# Patient Record
Sex: FEMALE | Race: WHITE | NOT HISPANIC OR LATINO | URBAN - METROPOLITAN AREA
[De-identification: names, ages, dates, MRNs, and addresses within clinical notes are randomized per-mention and may not be internally consistent; named-entity substitution may affect disease eponyms.]

---

## 2019-04-21 ENCOUNTER — WALK IN (OUTPATIENT)
Dept: URGENT CARE | Age: 29
End: 2019-04-21

## 2019-04-21 DIAGNOSIS — J02.0 STREP PHARYNGITIS: Primary | ICD-10-CM

## 2019-04-21 LAB
INTERNAL PROCEDURAL CONTROLS ACCEPTABLE: YES
S PYO AG THROAT QL IA.RAPID: NEGATIVE

## 2019-04-21 PROCEDURE — 99203 OFFICE O/P NEW LOW 30 MIN: CPT | Performed by: NURSE PRACTITIONER

## 2019-04-21 PROCEDURE — 87880 STREP A ASSAY W/OPTIC: CPT | Performed by: NURSE PRACTITIONER

## 2019-04-21 RX ORDER — AMOXICILLIN 500 MG/1
500 TABLET, FILM COATED ORAL 2 TIMES DAILY
Qty: 14 TABLET | Refills: 0 | Status: SHIPPED | OUTPATIENT
Start: 2019-04-21 | End: 2019-04-28

## 2019-04-21 ASSESSMENT — ENCOUNTER SYMPTOMS
EYE ITCHING: 0
COUGH: 0
FATIGUE: 1
SINUS PRESSURE: 0
FEVER: 1
EYE DISCHARGE: 0
NAUSEA: 0
SORE THROAT: 1
ABDOMINAL PAIN: 0
SWOLLEN GLANDS: 1
TROUBLE SWALLOWING: 1
CHILLS: 1
HEADACHES: 0
SHORTNESS OF BREATH: 0
EYE PAIN: 0
WHEEZING: 0
SINUS PAIN: 0
EYE REDNESS: 0
CHOKING: 0

## 2019-04-21 ASSESSMENT — PAIN SCALES - GENERAL: PAINLEVEL: 3-4

## 2019-11-30 ENCOUNTER — WALK IN (OUTPATIENT)
Dept: URGENT CARE | Age: 29
End: 2019-11-30

## 2019-11-30 DIAGNOSIS — H66.003 NON-RECURRENT ACUTE SUPPURATIVE OTITIS MEDIA OF BOTH EARS WITHOUT SPONTANEOUS RUPTURE OF TYMPANIC MEMBRANES: Primary | ICD-10-CM

## 2019-11-30 PROCEDURE — 99214 OFFICE O/P EST MOD 30 MIN: CPT | Performed by: NURSE PRACTITIONER

## 2019-11-30 RX ORDER — AMOXICILLIN 875 MG/1
875 TABLET, COATED ORAL 2 TIMES DAILY
Qty: 20 TABLET | Refills: 0 | Status: SHIPPED | OUTPATIENT
Start: 2019-11-30 | End: 2019-12-10

## 2019-11-30 ASSESSMENT — ENCOUNTER SYMPTOMS
RHINORRHEA: 1
FATIGUE: 0
ABDOMINAL PAIN: 0
SHORTNESS OF BREATH: 0
WHEEZING: 0
SINUS PRESSURE: 1
CHILLS: 0
SORE THROAT: 0
NAUSEA: 0
VOMITING: 0
COUGH: 1
FEVER: 1
HEADACHES: 1
SINUS PAIN: 1

## 2019-11-30 ASSESSMENT — PAIN SCALES - GENERAL: PAINLEVEL: 5-6

## 2021-05-26 VITALS
HEART RATE: 77 BPM | HEIGHT: 66 IN | WEIGHT: 150 LBS | OXYGEN SATURATION: 100 % | TEMPERATURE: 98.5 F | SYSTOLIC BLOOD PRESSURE: 110 MMHG | DIASTOLIC BLOOD PRESSURE: 74 MMHG | BODY MASS INDEX: 24.11 KG/M2 | DIASTOLIC BLOOD PRESSURE: 68 MMHG | OXYGEN SATURATION: 99 % | BODY MASS INDEX: 24.11 KG/M2 | TEMPERATURE: 100 F | HEIGHT: 66 IN | RESPIRATION RATE: 14 BRPM | HEART RATE: 64 BPM | RESPIRATION RATE: 16 BRPM | SYSTOLIC BLOOD PRESSURE: 110 MMHG | WEIGHT: 150 LBS

## 2023-03-06 ENCOUNTER — OFFICE VISIT (OUTPATIENT)
Dept: INTERNAL MEDICINE CLINIC | Facility: CLINIC | Age: 33
End: 2023-03-06

## 2023-03-06 VITALS
OXYGEN SATURATION: 98 % | BODY MASS INDEX: 22.82 KG/M2 | DIASTOLIC BLOOD PRESSURE: 70 MMHG | SYSTOLIC BLOOD PRESSURE: 108 MMHG | HEIGHT: 66 IN | WEIGHT: 142 LBS | RESPIRATION RATE: 14 BRPM | HEART RATE: 74 BPM

## 2023-03-06 DIAGNOSIS — J30.9 ALLERGIC RHINITIS, UNSPECIFIED SEASONALITY, UNSPECIFIED TRIGGER: ICD-10-CM

## 2023-03-06 DIAGNOSIS — J45.20 MILD INTERMITTENT ASTHMA, UNSPECIFIED WHETHER COMPLICATED: Primary | ICD-10-CM

## 2023-03-06 RX ORDER — FLUTICASONE PROPIONATE 110 UG/1
1 AEROSOL, METERED RESPIRATORY (INHALATION) 2 TIMES DAILY
Qty: 1 EACH | Refills: 2 | Status: SHIPPED | OUTPATIENT
Start: 2023-03-06 | End: 2023-04-05

## 2023-03-06 RX ORDER — NORETHINDRONE ACETATE AND ETHINYL ESTRADIOL AND FERROUS FUMARATE 1MG-20(21)
1 KIT ORAL DAILY
COMMUNITY
Start: 2023-02-10

## 2023-03-06 RX ORDER — ALBUTEROL SULFATE 90 UG/1
2 AEROSOL, METERED RESPIRATORY (INHALATION) EVERY 6 HOURS PRN
Qty: 1 EACH | Refills: 3 | Status: SHIPPED | OUTPATIENT
Start: 2023-03-06

## 2025-06-06 NOTE — TELEPHONE ENCOUNTER
LM for patient on cell number. No answer on listed number for home.  Offered 6/13 1030 appt for Dr. Carballo in [place]. Requested patient call back to confirm. Records received from [facility].

## 2025-06-06 NOTE — TELEPHONE ENCOUNTER
Pt is calling to schedule a new consult appt.  Patient Name- Bindu Cuba  Referred by- Dr. Jessica Westbrook  Referred to- first available   Reason- low iron  Insurance-[employer] PPO  Please give pt a call back. Thank you.    Advised the pt of seanmercedes for her doctor to send records and a referral. Pt stated her doctor told her she does not need a referral. I advised it's the office policy and she will have to have a referral before she's schedule or order

## 2025-06-13 ENCOUNTER — OFFICE VISIT (OUTPATIENT)
Age: 35
End: 2025-06-13
Attending: INTERNAL MEDICINE
Payer: COMMERCIAL

## 2025-06-13 VITALS
DIASTOLIC BLOOD PRESSURE: 74 MMHG | RESPIRATION RATE: 18 BRPM | OXYGEN SATURATION: 100 % | TEMPERATURE: 98 F | SYSTOLIC BLOOD PRESSURE: 112 MMHG | HEIGHT: 66 IN | BODY MASS INDEX: 25.39 KG/M2 | WEIGHT: 158 LBS | HEART RATE: 84 BPM

## 2025-06-13 DIAGNOSIS — D50.0 IRON DEFICIENCY ANEMIA DUE TO CHRONIC BLOOD LOSS: Primary | ICD-10-CM

## 2025-06-13 LAB
BASOPHILS # BLD AUTO: 0.03 X10(3) UL (ref 0–0.2)
BASOPHILS NFR BLD AUTO: 0.3 %
EOSINOPHIL # BLD AUTO: 0.05 X10(3) UL (ref 0–0.7)
EOSINOPHIL NFR BLD AUTO: 0.5 %
ERYTHROCYTE [DISTWIDTH] IN BLOOD BY AUTOMATED COUNT: 13.1 %
FOLATE SERPL-MCNC: 29.4 NG/ML (ref 5.4–?)
HCT VFR BLD AUTO: 30.5 % (ref 35–48)
HGB BLD-MCNC: 10.5 G/DL (ref 12–16)
HGB RETIC QN AUTO: 32.8 PG (ref 28.2–36.6)
IMM GRANULOCYTES # BLD AUTO: 0.11 X10(3) UL (ref 0–1)
IMM GRANULOCYTES NFR BLD: 1.1 %
IMM RETICS NFR: 0.14 RATIO (ref 0.1–0.3)
IRON SATN MFR SERPL: 13 % (ref 15–50)
IRON SERPL-MCNC: 62 UG/DL (ref 50–170)
LYMPHOCYTES # BLD AUTO: 1.36 X10(3) UL (ref 1–4)
LYMPHOCYTES NFR BLD AUTO: 13.5 %
MCH RBC QN AUTO: 30.1 PG (ref 26–34)
MCHC RBC AUTO-ENTMCNC: 34.4 G/DL (ref 31–37)
MCV RBC AUTO: 87.4 FL (ref 80–100)
MONOCYTES # BLD AUTO: 0.58 X10(3) UL (ref 0.1–1)
MONOCYTES NFR BLD AUTO: 5.8 %
NEUTROPHILS # BLD AUTO: 7.91 X10 (3) UL (ref 1.5–7.7)
NEUTROPHILS # BLD AUTO: 7.91 X10(3) UL (ref 1.5–7.7)
NEUTROPHILS NFR BLD AUTO: 78.8 %
PLATELET # BLD AUTO: 198 10(3)UL (ref 150–450)
RBC # BLD AUTO: 3.49 X10(6)UL (ref 3.8–5.3)
RETICS # AUTO: 62.5 X10(3) UL (ref 22.5–147.5)
RETICS/RBC NFR AUTO: 1.8 % (ref 0.5–2.5)
TOTAL IRON BINDING CAPACITY: 486 UG/DL (ref 250–425)
TRANSFERRIN SERPL-MCNC: 395 MG/DL (ref 250–380)
VIT B12 SERPL-MCNC: 241 PG/ML (ref 211–911)
WBC # BLD AUTO: 10 X10(3) UL (ref 4–11)

## 2025-06-13 RX ORDER — ASPIRIN 81 MG/1
81 TABLET ORAL DAILY
COMMUNITY

## 2025-06-13 RX ORDER — PRENATAL VIT 49/IRON FUM/FOLIC 6.75-0.2MG
TABLET ORAL AS DIRECTED
COMMUNITY

## 2025-06-13 NOTE — CONSULTS
[facility]ology [facility] Initial Consultation Note    Patient Name: Bindu Sanderson  Medical Record Number: [id number]    YOB: 1990   Date of Consultation: 6/13/2025     Reason for Consultation/Chief Complaint:  iron deficiency anemia      History of Present Illness:  Mrs. Sanderson is a 35 year old F (currently 33 weeks pregnant) with PMHx of iron deficiency anemia who presents to hematology/oncology today for evaluation and treatment recommendations of iron deficiency anemia.    5/7/25: She got outpatient labs done which showed the following  --  Hb 10.9 g/dL  MCV 88   Plt 236  Ferritin 9.1 ng/mL    She is fatigued. Has never had IV iron before. Is on PO iron supplements but these are constipating her.    Past Medical History:  Past Medical History:    Asthma (HCC)       Past Surgical History:  History reviewed. No pertinent surgical history.    Current Outpatient Medications:  Current Outpatient Medications on File Prior to Visit   Medication Sig Dispense Refill    aspirin 81 MG Oral Tab EC Take 1 tablet (81 mg total) by mouth daily.      Prenatal 6.75-0.2 MG Oral Tab Take by mouth As Directed.      albuterol 108 (90 Base) MCG/ACT Inhalation Aero Soln Inhale 2 puffs into the lungs every 6 (six) hours as needed for Wheezing. 1 each 3     No current facility-administered medications on file prior to visit.       Allergies:   No Known Allergies    Family Medical History:  History reviewed. No pertinent family history.    Social History:  Social History     Social History Narrative    Not on file     Social History     Socioeconomic History    Marital status: Single   Tobacco Use    Smoking status: Never    Smokeless tobacco: Never   Vaping Use    Vaping status: Never Used   Substance and Sexual Activity    Alcohol use: Not Currently     Comment: Socially    Drug use: Never    Sexual activity: Yes     Social Drivers of Health     Food Insecurity: Low Risk  (3/21/2024)    Received from  [facility]    Food Insecurity     Have there been times that your food ran out, and you didn't have money to get more?: No     Are there times that you worry that this might happen?: No   Transportation Needs: Low Risk  (3/21/2024)    Received from [facility]    Transportation Needs     Do you have trouble getting transportation to medical appointments?: No     How do you normally get to and from your appointments?: Family/Friend       Review of Systems:  A 10-point ROS was done with pertinent positives and negatives per the HPI.     ECOG Performance Status: 0    Vital Signs:  Height: 167.6 cm (5' 6\") (06/13 1041)  Weight: 71.7 kg (158 lb) (06/13 1041)  BSA (Calculated - sq m): 1.81 sq meters (06/13 1041)  Pulse: 84 (06/13 1041)  BP: 112/74 (06/13 1041)  Temp: 97.5 °F (36.4 °C) (06/13 1041)  Do Not Use - Resp Rate: --  SpO2: 100 % (06/13 1041)  Wt Readings from Last 6 Encounters:   06/13/25 71.7 kg (158 lb)   03/06/23 64.4 kg (142 lb)       Physical Examination:  General: Well-nourished and well-appearing. No acute distress. Pleasant affect  Eyes: EOMI  Respiratory: Lungs clear to auscultation bilaterally.  Cardiovascular: Regular rate and rhythm, no murmurs. No lower extremity edema.   GI: Gravid uterus  Heme: normal capillary refill. No ecchymosis, petechiae, or purpura.  Neurological: Alert and oriented. No apparent focal sensory or motor deficits  Skin: no rashes or lesions.  Psychiatric: Normal mood and affect.    Data Review  Laboratory Studies:  No results for input(s): \"WBC\", \"HGB\", \"HCT\", \"PLT\", \"MCV\", \"RDW\", \"NEPRELIM\" in the last 168 hours.  No results for input(s): \"NA\", \"K\", \"CL\", \"CO2\", \"BUN\", \"CREATSERUM\", \"GFRAA\", \"GFRNAA\", \"GLU\", \"CA\", \"PHOS\", \"TP\", \"ALB\", \"ALKPHO\", \"AST\", \"ALT\", \"BILT\" in the last 168 hours.    Invalid input(s): \"MAG\"  No results for input(s): \"PT\", \"INR\", \"PTT\", \"FIB\" in the last 168 hours.      Impression/Recommendations:  .  Kin is a 35 year old F (currently 33 weeks pregnant) with PMHx of iron deficiency anemia who presents to hematology/oncology today for evaluation and treatment recommendations of iron deficiency anemia.    Severe Iron Deficiency Anemia; Anemia of Third Trimester Pregnancy     5/7/25: She got outpatient labs done which showed the following  --  Hb 10.9 g/dL  MCV 88   Plt 236  Ferritin 9.1 ng/mL    She is fatigued. Has never had IV iron before. Is on PO iron supplements but these are constipating her.      Recommendations:  Labs today: CBC with diff, iron/TIBC, iron saturation, Vit B12, folate    Patient is symptomatic from severe iron deficiency anemia (9 ng/mL), as per H&P and assessment above. Patient will benefit from IV iron, with a targeted goal of ferritin 50 ng/mL. Today, I discussed with the patient her diagnosis of iron deficiency anemia, and the proposed treatment of IV iron. Benefits and risks were discussed in detail. Written information was also provided. After thorough discussion, patient has consented to start IV Iron Dextran (1 dose, 1000 mg, IV over 1 hour -- with 25 mg test dose to be given prior to infusion).    Orders were placed for IV Iron Dextran. Patient will schedule appointment for IV iron infusion once insurance approval is obtained.      Follow Up: Return to clinic in 4 weeks after IV iron for labs and visit with me.     Nicole Carballo D.O.  [facility]      Note to patient: The 21 Century Cures Act makes medical notes like these available to patients in the interest of transparency. However, be advised this is a medical document. It is intended as peer to peer communication. It is written in medical language and may contain abbreviations or verbiage that are unfamiliar. It may appear blunt or direct. Medical documents are intended to carry relevant information, facts as evident, and the clinical opinion of the practitioner.       In reviewing this  note, please be advised that Dragon Voice Recognition software used to dictate the note may have made errors in recognizing some of the words or phrases.

## 2025-06-13 NOTE — PATIENT INSTRUCTIONS
Hello,     You were seen today for your diagnosis of iron deficiency anemia. Your ferritin (storage of iron) is low and you need IV iron.     I have ordered 1 dose of IV Iron Dextran (INFeD) for you. Please schedule this once insurance approves the IV iron (our team will call you to make the appointment).    We attached some information about iron-rich foods (kale, spinach, dark/leafy vegetables). Please try to incorporate these into your diet.     Please schedule a follow up appointment to see me again 6-8 weeks after your infusion. We will repeat iron studies (labs) at this time to see if you absorbed the iron appropriately.     Thank you!     It was a pleasure meeting you.  Dr. Carballo

## 2025-06-13 NOTE — PROGRESS NOTES
Pt here for new consult regarding iron deficiency anemia. Medications and medical history reviewed. Pt is currently 33 weeks pregnant. She is currently on iron tablets which cause her mild constipation. She is fatigued. She states her meat intake has been decreased with pregnancy since she is unable to tolerate it. No rectal bleeding or blood in the stool reported. No dyspnea reported. No previous hx of iron or blood transfusions.       Education Record    Learner:  Patient    Disease / Diagnosis: PAULO in pregnancy    Barriers / Limitations:  None   Comments:    Method:  Discussion   Comments:    General Topics:  Medication, Pain, Side effects and symptom management, and Plan of care reviewed   Comments:    Outcome:  Observed demonstration and Shows understanding   Comments:

## 2025-06-16 RX ORDER — MELATONIN
1000 DAILY
Qty: 30 TABLET | Refills: 2 | Status: SHIPPED | OUTPATIENT
Start: 2025-06-16 | End: 2025-06-16

## 2025-06-16 RX ORDER — MELATONIN
1000 DAILY
Qty: 30 TABLET | Refills: 2 | Status: SHIPPED | OUTPATIENT
Start: 2025-06-16

## 2025-06-26 ENCOUNTER — OFFICE VISIT (OUTPATIENT)
Age: 35
End: 2025-06-26
Attending: INTERNAL MEDICINE
Payer: COMMERCIAL

## 2025-06-26 ENCOUNTER — TELEPHONE (OUTPATIENT)
Age: 35
End: 2025-06-26

## 2025-06-26 VITALS
SYSTOLIC BLOOD PRESSURE: 103 MMHG | HEART RATE: 80 BPM | OXYGEN SATURATION: 99 % | TEMPERATURE: 98 F | DIASTOLIC BLOOD PRESSURE: 63 MMHG | RESPIRATION RATE: 16 BRPM

## 2025-06-26 DIAGNOSIS — D50.0 IRON DEFICIENCY ANEMIA DUE TO CHRONIC BLOOD LOSS: Primary | ICD-10-CM

## 2025-06-26 NOTE — TELEPHONE ENCOUNTER
Spoke with patient and aware of insurance preferred medication and letter received for Infed denial.  Pt understanding and will come to appt today as scheduled.

## 2025-06-26 NOTE — PROGRESS NOTES
Pt here for Feraheme. Pt denies any issues or concerns.      Ordering Provider: aKit Cuenca Exp: 1 of 2 remain     Pt tolerated infusion without difficulty or complaint. Reviewed next appt date/time: yes, 7/2 3 pm for #2 Feraheme and 7/24 for labs/MD as previously scheduled.      Education Record  Learner:  Patient  Disease / Diagnosis: Iron Deficiency anemia in pregnancy  Barriers / Limitations:  None  Method:  Brief focused and Discussion  General Topics:  Medication, Side effects and symptom management, and Plan of care reviewed  Outcome:  Shows understanding    Post vitals checked. Discharged home in stable condition, no new complaints.

## 2025-07-02 ENCOUNTER — OFFICE VISIT (OUTPATIENT)
Age: 35
End: 2025-07-02
Attending: INTERNAL MEDICINE
Payer: COMMERCIAL

## 2025-07-02 VITALS
RESPIRATION RATE: 16 BRPM | OXYGEN SATURATION: 99 % | TEMPERATURE: 98 F | SYSTOLIC BLOOD PRESSURE: 100 MMHG | HEART RATE: 73 BPM | DIASTOLIC BLOOD PRESSURE: 62 MMHG

## 2025-07-02 DIAGNOSIS — D50.0 IRON DEFICIENCY ANEMIA DUE TO CHRONIC BLOOD LOSS: Primary | ICD-10-CM

## 2025-07-02 NOTE — PROGRESS NOTES
Education Record    Learner:  Patient    Disease / Diagnosis: iron deficiency anemia    Barriers / Limitations:  None   Comments:    Method:  Brief focused   Comments:    General Topics:  Plan of care reviewed   Comments:    Outcome:  Shows understanding   Comments:    Pt discharged in stable condition.  Pt to rtc 7/24 for md je appt.

## 2025-07-26 NOTE — PROGRESS NOTES
Pt to be discharged home, reactive NST and she is now feeling baby move. Dr Mesa reviewed fetal tracing and spoke with patient and her . Return precautions reviewed, pt verbalizes understanding.

## 2025-07-26 NOTE — ANESTHESIA PROCEDURE NOTES
Labor Analgesia    Date/Time: 7/26/2025 2:31 PM    Performed by: Bob Aldana MD  Authorized by: Bob Aldana MD      General Information and Staff    Start Time:  7/26/2025 2:31 PM  End Time:  7/26/2025 2:50 PM  Anesthesiologist:  Bob Aldana MD  Performed by:  Anesthesiologist  Patient Location:  OB  Site Identification: surface landmarks    Reason for Block: labor epidural    Preanesthetic Checklist: patient identified, IV checked, risks and benefits discussed, monitors and equipment checked, pre-op evaluation, timeout performed, IV bolus, anesthesia consent and sterile technique used      Procedure Details    Patient Position:  Sitting  Prep: ChloraPrep    Monitoring:  Heart rate and continuous pulse ox  Approach:  Midline    Epidural Needle    Injection Technique:  HERMINIA air  Needle Type:  Tuohy  Needle Gauge:  17 G  Needle Length:  3.375 in  Needle Insertion Depth:  5  Location:  L3-4    Spinal Needle      Catheter    Catheter Type:  End hole  Catheter Size:  19 G  Catheter at Skin Depth:  10  Test Dose:  Negative    Assessment    Sensory Level:  T8    Additional Comments       Test dose 3cc + 2cc + Fentanyl 100mcg via epidural catheter.  Infusion started at 12cc/hr

## 2025-07-26 NOTE — NST
Nonstress Test   Patient: Bindu Sanderson    Gestation: 39w3d    NST:       Variability: Moderate           Accelerations: Yes           Decelerations: None            Baseline: 125 BPM           Uterine Irritability: No           Contractions: Irregular                                        Acoustic Stimulator: No           Nonstress Test Interpretation: Reactive                      FHR Category: Category I          Additional Comments    Physician Evaluation      NST Interpretation: Reactive    Disposition:   Discharged    Comments:     Danyelle Mesa MD

## 2025-07-26 NOTE — PROGRESS NOTES
Pt is a 35 year old female admitted to TR5/TRG5-A.     Chief Complaint   Patient presents with    Decreased Fetal Movement     Pt here with decreased fetal movement x1.5 hours ago.       Pt is  39w3d intra-uterine pregnancy.  History obtained, consents signed. Oriented to room, staff, and plan of care.

## 2025-07-26 NOTE — ANESTHESIA PREPROCEDURE EVALUATION
PRE-OP EVALUATION    Patient Name: Bindu Sanderson    Admit Diagnosis: Pregnancy (HCC) [Z34.90]    Pre-op Diagnosis: * No pre-op diagnosis entered *        Anesthesia Procedure: LABOR ANALGESIA    * No surgeons found in log *    Pre-op vitals reviewed.  Temp: 97.9 °F (36.6 °C)  Pulse: 61  Resp: 18  BP: 128/76     Body mass index is 25.82 kg/m².    Current medications reviewed.  Hospital Medications:  Current Medications[1]    Outpatient Medications:   Prescriptions Prior to Admission[2]    Allergies: Patient has no known allergies.      Anesthesia Evaluation    Patient summary reviewed.    Anesthetic Complications  (-) history of anesthetic complications         GI/Hepatic/Renal                                 Cardiovascular        Exercise tolerance: good     MET: >4                                           Endo/Other                                  Pulmonary      (+) asthma                     Neuro/Psych                              Patient Active Problem List:     Iron deficiency anemia due to chronic blood loss     Pregnancy (HCC)          Past Surgical History[3]  Social Hx on file[4]  History   Drug Use Unknown     Available pre-op labs reviewed.  Lab Results   Component Value Date    WBC 7.7 07/26/2025    RBC 3.79 (L) 07/26/2025    HGB 11.3 (L) 07/26/2025    HCT 31.5 (L) 07/26/2025    MCV 83.1 07/26/2025    MCH 29.8 07/26/2025    MCHC 35.9 07/26/2025    RDW 13.8 07/26/2025    .0 07/26/2025               Airway      Mallampati: II  Mouth opening: >3 FB  TM distance: 4 - 6 cm  Neck ROM: full Cardiovascular      Rhythm: regular  Rate: normal     Dental             Pulmonary      Breath sounds clear to auscultation bilaterally.               Other findings          ASA: 2   Plan: epidural             Plan/risks discussed with: patient            Present on Admission:  **None**               [1]  • lactated ringers infusion   Intravenous Continuous   • dextrose in lactated ringers 5% infusion    Intravenous PRN   • lactated ringers IV bolus 500 mL  500 mL Intravenous PRN   • acetaminophen (Tylenol Extra Strength) tab 500 mg  500 mg Oral Q6H PRN   • acetaminophen (Tylenol Extra Strength) tab 1,000 mg  1,000 mg Oral Q6H PRN   • ibuprofen (Motrin) tab 600 mg  600 mg Oral Once PRN   • ondansetron (Zofran) 4 MG/2ML injection 4 mg  4 mg Intravenous Q6H PRN   • oxyTOCIN in sodium chloride 0.9% (Pitocin) 30 Units/500mL infusion premix  62.5-900 jairon-units/min Intravenous Continuous   • terbutaline (Brethine) 1 MG/ML injection 0.25 mg  0.25 mg Subcutaneous PRN   • sodium citrate-citric acid (Bicitra) 500-334 MG/5ML oral solution 30 mL  30 mL Oral PRN   • ropivacaine (Naropin) 0.5% injection  30 mL Injection PRN   • misoprostol (CYTOTEC) partial tablets 25 mcg  25 mcg Oral Q2H   • oxyTOCIN in sodium chloride 0.9% (Pitocin) 30 Units/500mL infusion premix  0.5-20 jairon-units/min Intravenous Continuous   • HYDROmorphone (Dilaudid) 1 MG/ML injection 1 mg  1 mg Intravenous Q2H PRN   • lactated ringers IV bolus 1,000 mL  1,000 mL Intravenous Once   • fentaNYL-bupivacaine 2 mcg/mL-0.125% in sodium chloride 0.9% 100 mL EPIDURAL infusion premix  12 mL/hr Epidural Continuous   • fentaNYL (Sublimaze) 50 mcg/mL injection 100 mcg  100 mcg Epidural Once   • lidocaine 1.5%-EPINEPHrine 1:200,000 (Xylocaine-Epinephrine) injection  5 mL Injection PRN   • bupivacaine PF (Marcaine) 0.25% injection  30 mL Injection PRN   • lidocaine PF (Xylocaine-MPF) 2% injection  5 mL Injection PRN   • sodium chloride 0.9% PF injection 10 mL  10 mL Injection PRN   • ePHEDrine (PF) 25 MG/5 ML injection 5 mg  5 mg Intravenous PRN   • nalbuphine (Nubain) 10 mg/mL injection 2.5 mg  2.5 mg Intravenous Q15 Min PRN   [2]  Medications Prior to Admission   Medication Sig Dispense Refill Last Dose/Taking   • docusate sodium 50 MG Oral Cap Take 1 capsule (50 mg total) by mouth 2 (two) times daily.   7/25/2025   • Prenatal 6.75-0.2 MG Oral Tab Take by mouth As  Directed.   7/25/2025   • cyanocobalamin 1000 MCG Oral Tab Take 1 tablet (1,000 mcg total) by mouth daily. 30 tablet 2 More than a month   • aspirin 81 MG Oral Tab EC Take 1 tablet (81 mg total) by mouth daily.   More than a month   • albuterol 108 (90 Base) MCG/ACT Inhalation Aero Soln Inhale 2 puffs into the lungs every 6 (six) hours as needed for Wheezing. 1 each 3 More than a month   [3]  History reviewed. No pertinent surgical history.  [4]  Social History  Socioeconomic History   • Marital status:    Tobacco Use   • Smoking status: Never   • Smokeless tobacco: Never   Vaping Use   • Vaping status: Never Used   Substance and Sexual Activity   • Alcohol use: Not Currently     Comment: Socially   • Drug use: Never   • Sexual activity: Yes